# Patient Record
Sex: FEMALE | Race: OTHER | HISPANIC OR LATINO | URBAN - METROPOLITAN AREA
[De-identification: names, ages, dates, MRNs, and addresses within clinical notes are randomized per-mention and may not be internally consistent; named-entity substitution may affect disease eponyms.]

---

## 2020-08-08 ENCOUNTER — EMERGENCY (EMERGENCY)
Facility: HOSPITAL | Age: 55
LOS: 1 days | Discharge: HOME | End: 2020-08-08
Attending: EMERGENCY MEDICINE
Payer: SELF-PAY

## 2020-08-08 VITALS
SYSTOLIC BLOOD PRESSURE: 142 MMHG | TEMPERATURE: 98 F | OXYGEN SATURATION: 96 % | RESPIRATION RATE: 20 BRPM | DIASTOLIC BLOOD PRESSURE: 88 MMHG | HEART RATE: 91 BPM

## 2020-08-08 PROCEDURE — 99284 EMERGENCY DEPT VISIT MOD MDM: CPT

## 2020-08-08 PROCEDURE — 99283 EMERGENCY DEPT VISIT LOW MDM: CPT

## 2020-08-08 RX ORDER — DIPHENHYDRAMINE HCL 50 MG
1 CAPSULE ORAL
Qty: 15 | Refills: 0
Start: 2020-08-08 | End: 2020-08-12

## 2020-08-08 RX ORDER — DEXAMETHASONE 0.5 MG/5ML
8 ELIXIR ORAL ONCE
Refills: 0 | Status: COMPLETED | OUTPATIENT
Start: 2020-08-08 | End: 2020-08-08

## 2020-08-08 RX ORDER — FAMOTIDINE 10 MG/ML
1 INJECTION INTRAVENOUS
Qty: 10 | Refills: 0
Start: 2020-08-08 | End: 2020-08-12

## 2020-08-08 RX ORDER — FAMOTIDINE 10 MG/ML
20 INJECTION INTRAVENOUS DAILY
Refills: 0 | Status: DISCONTINUED | OUTPATIENT
Start: 2020-08-08 | End: 2020-08-13

## 2020-08-08 RX ORDER — DIPHENHYDRAMINE HCL 50 MG
50 CAPSULE ORAL EVERY 4 HOURS
Refills: 0 | Status: DISCONTINUED | OUTPATIENT
Start: 2020-08-08 | End: 2020-08-13

## 2020-08-08 RX ADMIN — FAMOTIDINE 20 MILLIGRAM(S): 10 INJECTION INTRAVENOUS at 22:15

## 2020-08-08 RX ADMIN — Medication 8 MILLIGRAM(S): at 22:15

## 2020-08-08 RX ADMIN — Medication 50 MILLIGRAM(S): at 22:15

## 2020-08-08 NOTE — ED PROVIDER NOTE - PROGRESS NOTE DETAILS
OSVALDO ALVAREZ; case signed out to me. pt well appearing, feeling better after pepcid, steroids and benadryl. Will d/c with pepcid and benadryl and advise pt to f/u with pmd.

## 2020-08-08 NOTE — ED PROVIDER NOTE - OBJECTIVE STATEMENT
56 y/o F with no pertinent PMH c/o bee sting to the plantar aspect of left foot which occurred 3 hours ago.  Patient is c/o swelling to the left foot and itchiness and hives all over body.  Patient also c/o itchiness at the roof of her mouth.  Denies difficulty breathing or swallowing.  Patient took ibuprofen prior to arrival.  Denies ever being stung by a bee in the past. 54 y/o F with no pertinent PMH c/o bee sting to the plantar aspect of left foot which occurred 3 hours ago.  Patient is c/o swelling to the left foot and itchiness and hives all over body.  Patient also c/o itchiness at the roof of her mouth.  Denies difficulty breathing or swallowing. No vomiting. Patient took ibuprofen prior to arrival.  Denies ever being stung by a bee in the past.

## 2020-08-08 NOTE — ED PROVIDER NOTE - NSFOLLOWUPINSTRUCTIONS_ED_ALL_ED_FT
take medications as prescribed - do not drive when taking benadryl  Return to ED immediately if you experience difficulty breathing, tongue swelling or difficulty swallowing

## 2020-08-08 NOTE — ED PROVIDER NOTE - PATIENT PORTAL LINK FT
You can access the FollowMyHealth Patient Portal offered by [facility] by registering at the following website: [email or web address]. By joining [facility]’s FollowMyHealth portal, you will also be able to view your health information using other applications (apps) compatible with our system.

## 2020-08-08 NOTE — ED PROVIDER NOTE - ATTENDING CONTRIBUTION TO CARE
I performed the initial face to face bedside interview with this patient regarding history of present illness, review of symptoms and past medical, social and family history.  I completed an independent physical examination.  I was the initial provider who evaluated this patient.  The history, review of symptoms and examination was documented by the scribe in my presence and I attest to the accuracy of the documentation.  I have signed out the follow up of any pending tests (i.e. labs, radiological studies) to the ACP.  I have discussed the patient’s plan of care and disposition with the ACP AJM: pt with bee sing. no anaphylaxis. treat allergy symptoms
